# Patient Record
Sex: FEMALE | Race: WHITE
[De-identification: names, ages, dates, MRNs, and addresses within clinical notes are randomized per-mention and may not be internally consistent; named-entity substitution may affect disease eponyms.]

---

## 2023-05-04 ENCOUNTER — HOSPITAL ENCOUNTER (EMERGENCY)
Dept: HOSPITAL 33 - ED | Age: 33
LOS: 1 days | Discharge: HOME | End: 2023-05-05
Payer: COMMERCIAL

## 2023-05-04 DIAGNOSIS — N39.0: ICD-10-CM
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
## 2023-05-04 DIAGNOSIS — Z79.899: ICD-10-CM

## 2023-05-04 DIAGNOSIS — Z72.0: ICD-10-CM
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
## 2023-05-04 DIAGNOSIS — A74.9: ICD-10-CM

## 2023-05-04 DIAGNOSIS — T84.213A: ICD-10-CM

## 2023-05-04 DIAGNOSIS — S20.212A: Primary | ICD-10-CM

## 2023-05-04 DIAGNOSIS — B37.31: ICD-10-CM

## 2023-05-04 DIAGNOSIS — S70.02XA: ICD-10-CM

## 2023-05-04 DIAGNOSIS — S70.12XA: ICD-10-CM

## 2023-05-04 DIAGNOSIS — A59.01: ICD-10-CM

## 2023-05-04 LAB
BACTERIA UR CULT: YES
RBC # URNS HPF: (no result) /HPF (ref 0–5)
WBC URNS QL MICRO: (no result) /HPF (ref 0–5)

## 2023-05-04 PROCEDURE — 71045 X-RAY EXAM CHEST 1 VIEW: CPT

## 2023-05-04 PROCEDURE — 81001 URINALYSIS AUTO W/SCOPE: CPT

## 2023-05-04 PROCEDURE — 71100 X-RAY EXAM RIBS UNI 2 VIEWS: CPT

## 2023-05-04 PROCEDURE — 87661 TRICHOMONAS VAGINALIS AMPLIF: CPT

## 2023-05-04 PROCEDURE — 73630 X-RAY EXAM OF FOOT: CPT

## 2023-05-04 PROCEDURE — 96372 THER/PROPH/DIAG INJ SC/IM: CPT

## 2023-05-04 PROCEDURE — 87077 CULTURE AEROBIC IDENTIFY: CPT

## 2023-05-04 PROCEDURE — 87086 URINE CULTURE/COLONY COUNT: CPT

## 2023-05-04 PROCEDURE — 87491 CHLMYD TRACH DNA AMP PROBE: CPT

## 2023-05-04 PROCEDURE — 99284 EMERGENCY DEPT VISIT MOD MDM: CPT

## 2023-05-04 PROCEDURE — 87801 DETECT AGNT MULT DNA AMPLI: CPT

## 2023-05-04 PROCEDURE — 87481 CANDIDA DNA AMP PROBE: CPT

## 2023-05-04 PROCEDURE — 87591 N.GONORRHOEAE DNA AMP PROB: CPT

## 2023-05-04 PROCEDURE — 87186 SC STD MICRODIL/AGAR DIL: CPT
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
## 2023-05-05 VITALS — DIASTOLIC BLOOD PRESSURE: 72 MMHG | SYSTOLIC BLOOD PRESSURE: 117 MMHG

## 2023-05-05 VITALS — HEART RATE: 82 BPM

## 2023-05-05 VITALS — OXYGEN SATURATION: 96 %

## 2023-05-05 LAB
C TRACH DNA SPEC QL NAA+PROBE: DETECTED
CANDIDA DNA SPEC QL NAA+PROBE: NOT DETECTED